# Patient Record
Sex: FEMALE | Race: WHITE | Employment: FULL TIME | URBAN - METROPOLITAN AREA
[De-identification: names, ages, dates, MRNs, and addresses within clinical notes are randomized per-mention and may not be internally consistent; named-entity substitution may affect disease eponyms.]

---

## 2017-04-07 ENCOUNTER — OFFICE VISIT (OUTPATIENT)
Dept: FAMILY MEDICINE CLINIC | Age: 57
End: 2017-04-07

## 2017-04-07 VITALS
SYSTOLIC BLOOD PRESSURE: 101 MMHG | BODY MASS INDEX: 20.93 KG/M2 | DIASTOLIC BLOOD PRESSURE: 75 MMHG | TEMPERATURE: 100.1 F | HEIGHT: 65 IN | HEART RATE: 75 BPM | RESPIRATION RATE: 20 BRPM | OXYGEN SATURATION: 97 % | WEIGHT: 125.6 LBS

## 2017-04-07 DIAGNOSIS — Z11.6 MALARIA SCREENING: ICD-10-CM

## 2017-04-07 DIAGNOSIS — B34.9 VIRAL SYNDROME: Primary | ICD-10-CM

## 2017-04-07 DIAGNOSIS — R68.83 CHILL: ICD-10-CM

## 2017-04-07 DIAGNOSIS — J02.9 SORE THROAT: ICD-10-CM

## 2017-04-07 LAB
QUICKVUE INFLUENZA TEST: NEGATIVE
VALID INTERNAL CONTROL?: YES

## 2017-04-07 RX ORDER — DOXYCYCLINE HYCLATE 100 MG
TABLET ORAL
COMMUNITY
Start: 2017-03-21

## 2017-04-07 NOTE — PROGRESS NOTES
1. Have you been to the ER, urgent care clinic since your last visit? Hospitalized since your last visit? No    2. Have you seen or consulted any other health care providers outside of the [address] [place] [address] since your last visit? Include any pap smears or colon screening. No    In the event something were to happen to you and you were unable to speak on your behalf, do you have an Advance Directive/ Living Will in place stating your wishes? NO    If yes, do we have a copy on file NO    If no, would you like information:    Pt offered and declined. Pt stated that she wants to make sure she does not have walking pneumonia or malaria; not sure if congestion and runny nose is allergies or not. Went to [facility] about 2 weeks ago, came back last Sunday and she said that is when everything started. Pt stated that last night she had chills and body aches, but has not had any this morning. C/O sore throat, unsure if she has had a fever, has not checked. Pt was placed on Doxycycline 100mg PO daily 2 days prior to entering malaria risk area, PO daily while in area, and PO daily for 28 days after returning from area. Pt stated that she has been on the medication about 2 weeks now.

## 2017-04-07 NOTE — LETTER
4/11/2017 12:59 PM 
 
Ms. Giovana Romano 51 [address] [address] Dear Giovana Magallanes: Please find your most recent results below. Resulted Orders AMB POC RAPID INFLUENZA TEST Result Value Ref Range VALID INTERNAL CONTROL POC Yes QuickVue Influenza test Negative Negative CBC WITH AUTOMATED DIFF Result Value Ref Range WBC 5.9 3.4 - 10.8 x10E3/uL  
 RBC 3.96 3.77 - 5.28 x10E6/uL HGB 11.5 11.1 - 15.9 g/dL HCT 35.4 34.0 - 46.6 % MCV 89 79 - 97 fL  
 MCH 29.0 26.6 - 33.0 pg  
 MCHC 32.5 31.5 - 35.7 g/dL  
 RDW 13.3 12.3 - 15.4 % PLATELET 076 488 - 622 x10E3/uL NEUTROPHILS 62 % Lymphocytes 22 % MONOCYTES 14 % EOSINOPHILS 2 % BASOPHILS 0 %  
 ABS. NEUTROPHILS 3.6 1.4 - 7.0 x10E3/uL Abs Lymphocytes 1.3 0.7 - 3.1 x10E3/uL  
 ABS. MONOCYTES 0.8 0.1 - 0.9 x10E3/uL  
 ABS. EOSINOPHILS 0.1 0.0 - 0.4 x10E3/uL  
 ABS. BASOPHILS 0.0 0.0 - 0.2 x10E3/uL IMMATURE GRANULOCYTES 0 %  
 ABS. IMM. GRANS. 0.0 0.0 - 0.1 x10E3/uL Narrative Performed at:  [address]  [id number] : Alhaji Heard MD, Phone:  [phone] PARASITE EXAM, BLOOD Result Value Ref Range Parasite exam, blood Comment None Seen Comment: No Plasmodium, Babesia, or other blood parasites seen. One negative result does not rule out the possibility of a parasitic 
infestation. If protozoal, filarial, or trypanosomal infection is 
strongly suspected, test should be performed at least three times with 
samples obtained at different times in the fever cycle. Narrative Performed at:  [address]  [id number] : Alhaji Heard MD, Phone:  [phone] Buddy Soria MD

## 2017-04-07 NOTE — MR AVS SNAPSHOT
Visit Information Date & Time Provider Department Dept. Phone Encounter #  
 4/7/2017  9:00 AM Raul Marte MD [facility] [phone] [id number] Follow-up Instructions Return in about 3 months (around 7/7/2017) for Aracelis NP appt with Dr. Coleen Taylor. Upcoming Health Maintenance Date Due Hepatitis C Screening 1960 DTaP/Tdap/Td series (1 - Tdap) 8/5/1982 BREAST CANCER SCRN MAMMOGRAM 6/16/2017 PAP AKA CERVICAL CYTOLOGY 2/19/2019 COLONOSCOPY 2/24/2024 Allergies as of 4/7/2017  Review Complete On: 4/7/2017 By: Amy Carlson Severity Noted Reaction Type Reactions Malarone [Atovaquone-proguanil]  07/05/2016    Hives Current Immunizations  Reviewed on 10/22/2014 Name Date Influenza Vaccine 10/10/2014 Not reviewed this visit You Were Diagnosed With   
  
 Codes Comments Viral syndrome    -  Primary ICD-10-CM: B34.9 ICD-9-CM: 079.99 Chill     ICD-10-CM: R68.83 ICD-9-CM: 780.64 Sore throat     ICD-10-CM: J02.9 ICD-9-CM: 947 Malaria screening     ICD-10-CM: Z11.6 ICD-9-CM: V75.1 Vitals BP Pulse Temp Resp Height(growth percentile) Weight(growth percentile) 101/75 (BP 1 Location: Left arm, BP Patient Position: Sitting) 75 100.1 °F (37.8 °C) (Oral) 20 5' 5\" (1.651 m) 125 lb 9.6 oz (57 kg) LMP SpO2 BMI OB Status Smoking Status 06/28/2010 97% 20.9 kg/m2 Postmenopausal Never Smoker Vitals History BMI and BSA Data Body Mass Index Body Surface Area  
 20.9 kg/m 2 1.62 m 2 Preferred Pharmacy Pharmacy Name Phone [facility] [address] [phone] Your Updated Medication List  
  
   
This list is accurate as of: 4/7/17  9:41 AM.  Always use your most recent med list.  
  
  
  
  
 CLARITIN 10 mg tablet Generic drug:  loratadine Take 10 mg by mouth daily. diphenhydrAMINE 25 mg capsule Commonly known as:  BENADRYL Take 25 mg by mouth every six (6) hours as needed. doxycycline 100 mg tablet Commonly known as:  VIBRA-TABS  
  
 raNITIdine hcl 150 mg capsule Take 150 mg by mouth two (2) times a day. Use PRN  
  
 VITAMIN D3 1,000 unit tablet Generic drug:  cholecalciferol Take 1,000 Units by mouth daily. We Performed the Following AMB POC RAPID INFLUENZA TEST [50481 CPT(R)] CBC WITH AUTOMATED DIFF [48886 CPT(R)] PARASITE EXAM, BLOOD [id number] CPT(R)] Follow-up Instructions Return in about 3 months (around 7/7/2017) for Aracelis NP appt with Dr. Miller Boyd! Dear Gab Sit: 
Thank you for requesting a [employer] account. Our records indicate that you already have an active [employer] account. You can access your account anytime at [email or web address] Did you know that you can access your hospital and ER discharge instructions at any time in [employer]? You can also review all of your test results from your hospital stay or ER visit. Additional Information If you have questions, please visit the Frequently Asked Questions section of the [employer] website at [email or web address] Remember, [employer] is NOT to be used for urgent needs. For medical emergencies, dial 911. Now available from your iPhone and Android! Please provide this summary of care documentation to your next provider. Your primary care clinician is listed as Sanya Casillas. If you have any questions after today's visit, please call [phone].

## 2017-04-07 NOTE — PROGRESS NOTES
Has had cough yest.  No HA. Achy. ST. No color to mucus. Nurse history read and confirmed by patient. Visit Vitals    /75 (BP 1 Location: Left arm, BP Patient Position: Sitting)    Pulse 75    Temp 100.1 °F (37.8 °C) (Oral)    Resp 20    Ht 5' 5\" (1.651 m)    Wt 125 lb 9.6 oz (57 kg)    LMP 06/28/2010    SpO2 97%    BMI 20.9 kg/m2       Patient alert and cooperative. Lungs clear. Assessment:  1. Presumed viral syndrome with fever. Plan:  1. Will check CBC and parasite examination of blood to rule out remote possibility of malaria as she is taking Doxycycline. 2. Flu test negative. 3. Follow up if persistent purulence or high fever for possible alternate antibiotic. 4. Recheck otherwise here prn.

## 2017-04-09 LAB
BASOPHILS # BLD AUTO: 0 X10E3/UL (ref 0–0.2)
BASOPHILS NFR BLD AUTO: 0 %
EOSINOPHIL # BLD AUTO: 0.1 X10E3/UL (ref 0–0.4)
EOSINOPHIL NFR BLD AUTO: 2 %
ERYTHROCYTE [DISTWIDTH] IN BLOOD BY AUTOMATED COUNT: 13.3 % (ref 12.3–15.4)
HCT VFR BLD AUTO: 35.4 % (ref 34–46.6)
HGB BLD-MCNC: 11.5 G/DL (ref 11.1–15.9)
IMM GRANULOCYTES # BLD: 0 X10E3/UL (ref 0–0.1)
IMM GRANULOCYTES NFR BLD: 0 %
LYMPHOCYTES # BLD AUTO: 1.3 X10E3/UL (ref 0.7–3.1)
LYMPHOCYTES NFR BLD AUTO: 22 %
MCH RBC QN AUTO: 29 PG (ref 26.6–33)
MCHC RBC AUTO-ENTMCNC: 32.5 G/DL (ref 31.5–35.7)
MCV RBC AUTO: 89 FL (ref 79–97)
MONOCYTES # BLD AUTO: 0.8 X10E3/UL (ref 0.1–0.9)
MONOCYTES NFR BLD AUTO: 14 %
NEUTROPHILS # BLD AUTO: 3.6 X10E3/UL (ref 1.4–7)
NEUTROPHILS NFR BLD AUTO: 62 %
PARASITE BLD: NORMAL
PLATELET # BLD AUTO: 271 X10E3/UL (ref 150–379)
RBC # BLD AUTO: 3.96 X10E6/UL (ref 3.77–5.28)
WBC # BLD AUTO: 5.9 X10E3/UL (ref 3.4–10.8)

## 2017-04-11 NOTE — PROGRESS NOTES
Labs discussed with ID verified patient. She is feeling better and thinks her sx were all upper respiratory.  Doubts GI would be the next step